# Patient Record
Sex: MALE | Race: OTHER | HISPANIC OR LATINO | Employment: OTHER | URBAN - METROPOLITAN AREA
[De-identification: names, ages, dates, MRNs, and addresses within clinical notes are randomized per-mention and may not be internally consistent; named-entity substitution may affect disease eponyms.]

---

## 2024-05-26 ENCOUNTER — HOSPITAL ENCOUNTER (EMERGENCY)
Facility: HOSPITAL | Age: 49
Discharge: HOME | End: 2024-05-26
Attending: STUDENT IN AN ORGANIZED HEALTH CARE EDUCATION/TRAINING PROGRAM
Payer: COMMERCIAL

## 2024-05-26 VITALS
OXYGEN SATURATION: 97 % | RESPIRATION RATE: 16 BRPM | HEART RATE: 88 BPM | SYSTOLIC BLOOD PRESSURE: 165 MMHG | DIASTOLIC BLOOD PRESSURE: 62 MMHG | TEMPERATURE: 99 F | WEIGHT: 194 LBS

## 2024-05-26 DIAGNOSIS — T15.92XA FOREIGN BODY OF LEFT EYE, INITIAL ENCOUNTER: Primary | ICD-10-CM

## 2024-05-26 DIAGNOSIS — H57.12 ACUTE LEFT EYE PAIN: ICD-10-CM

## 2024-05-26 DIAGNOSIS — T15.02XA RUST RING OF LEFT CORNEA DUE TO METALLIC FOREIGN BODY: ICD-10-CM

## 2024-05-26 PROCEDURE — 65222 REMOVE FOREIGN BODY FROM EYE: CPT | Mod: LT

## 2024-05-26 PROCEDURE — 25000003 PHARM REV CODE 250: Performed by: STUDENT IN AN ORGANIZED HEALTH CARE EDUCATION/TRAINING PROGRAM

## 2024-05-26 PROCEDURE — 99283 EMERGENCY DEPT VISIT LOW MDM: CPT

## 2024-05-26 RX ORDER — OFLOXACIN 3 MG/ML
1 SOLUTION/ DROPS OPHTHALMIC
Qty: 10 ML | Refills: 0 | Status: SHIPPED | OUTPATIENT
Start: 2024-05-26 | End: 2024-06-02

## 2024-05-26 RX ADMIN — FLUORESCEIN SODIUM 1 EACH: 1 STRIP OPHTHALMIC at 02:05

## 2024-05-26 NOTE — ED PROVIDER NOTES
Encounter Date: 5/26/2024    SCRIBE #1 NOTE: I, Genie Cruz, am scribing for, and in the presence of,  Joey Hartman MD. I have scribed the following portions of the note - Other sections scribed: HPI, ROS, PE.       History     Chief Complaint   Patient presents with    Eye Problem     Pt. C/o left eye pain x2 days ago reports was seen at urgent care and told he has a piece of metal in his eye. And sent here. Reports vision changes in affected eye     Patient is a 48 year old male that presents to the ED for left eye pain and redness onset 2 days ago. The patient states he was working with metal two days ago. He denies visual disturbances.     The history is provided by the patient and medical records. No  was used.     Review of patient's allergies indicates:  No Known Allergies  No past medical history on file.  No past surgical history on file.  No family history on file.     Review of Systems   Constitutional:  Negative for chills and fever.   HENT:  Negative for congestion, rhinorrhea and sore throat.    Eyes:  Positive for pain and redness. Negative for visual disturbance.   Respiratory:  Negative for cough and shortness of breath.    Cardiovascular:  Negative for chest pain.   Gastrointestinal:  Negative for abdominal pain, nausea and vomiting.   Genitourinary:  Negative for dysuria and hematuria.   Musculoskeletal:  Negative for joint swelling.   Skin:  Negative for rash.   Neurological:  Negative for weakness.   Psychiatric/Behavioral:  Negative for confusion.    All other systems reviewed and are negative.      Physical Exam     Initial Vitals [05/26/24 1300]   BP Pulse Resp Temp SpO2   (!) 165/62 88 16 98.6 °F (37 °C) 97 %      MAP       --         Physical Exam    Nursing note and vitals reviewed.  Constitutional: He is not diaphoretic. No distress.   HENT:   Head: Normocephalic and atraumatic.   Eyes: EOM are normal. Pupils are equal, round, and reactive to light. Left conjunctiva  is injected.   Small ocular foreign body over cornea. Left visual acuity 20/25. Right visual acuity 20/25.   Neck: Neck supple.   Normal range of motion.  Cardiovascular:  Normal rate and regular rhythm.           Pulmonary/Chest: Breath sounds normal. No respiratory distress.   Abdominal: Abdomen is soft. He exhibits no distension. There is no abdominal tenderness.   Musculoskeletal:         General: No edema.      Cervical back: Normal range of motion and neck supple.     Neurological: He is alert and oriented to person, place, and time. He has normal strength. No cranial nerve deficit or sensory deficit.   Skin: Skin is warm. Capillary refill takes less than 2 seconds.   Psychiatric: He has a normal mood and affect.         ED Course   Foreign Body    Date/Time: 5/26/2024 1:40 PM    Performed by: Joey Hartman IV, MD  Authorized by: Joey Hartman IV, MD  Consent Done: Yes  Consent: Verbal consent obtained.  Risks and benefits: risks, benefits and alternatives were discussed  Consent given by: patient  Patient identity confirmed: name and verbally with patient  Body area: eye  Anesthesia method: properacaine.  Localization method: slit lamp, eyelid eversion and visualized  Removal mechanism: 27-gauge needle, 25-gauge needle, moist cotton swab and irrigation  Corneal abrasion size: small  Corneal abrasion location: central  Residual rust ring present.  Dressing: antibiotic drops  Depth: embedded  Complexity: complex  1 objects recovered.  Post-procedure assessment: foreign body removed  Patient tolerance: Patient tolerated the procedure well with no immediate complications      Labs Reviewed - No data to display       Imaging Results    None          Medications   fluorescein ophthalmic strip 1 each (1 each Right Eye Given 5/26/24 1430)     Medical Decision Making  48-year-old presenting with metal ocular foreign body on the left for 2 days  Seen in urgent Care then sent here for removal  Foreign body removed  with slit-lamp residual rust ring versus small retained foreign body visualized  I discussed this with patient and need for follow up with Ophthalmology I discussed the case with Dr. Armstrong who saw the patient in clinic this week likely Tuesday patient instructed to call the clinic and set up an appointment will put on antibiotic drops until then return precautions given    Problems Addressed:  Acute left eye pain: acute illness or injury that poses a threat to life or bodily functions  Foreign body of left eye, initial encounter: acute illness or injury that poses a threat to life or bodily functions  Rust ring of left cornea due to metallic foreign body: acute illness or injury that poses a threat to life or bodily functions    Amount and/or Complexity of Data Reviewed  Discussion of management or test interpretation with external provider(s): Discussed with Dr. Higuera ophthalmology who recommends clinic follow up ofloxacin drops    Risk  OTC drugs.  Prescription drug management.  Minor surgery with identified risk factors.            Scribe Attestation:   Scribe #1: I performed the above scribed service and the documentation accurately describes the services I performed. I attest to the accuracy of the note.    Attending Attestation:           Physician Attestation for Scribe:  Physician Attestation Statement for Scribe #1: I, Joey Hartman MD, reviewed documentation, as scribed by Genie Cruz in my presence, and it is both accurate and complete.             ED Course as of 05/26/24 1936   Sun May 26, 2024   1527 Paged ophthalmology  [ED]   1534 Discussed with Venancio Armstrong MD [ED]   1535 Dr. Armstrong - recommends follow up in clinic on Tuesday antibiotic drops return precautions [AC]   1544 Re-evaluated the patient at this time. He reports feeling better. Lid everted, no residual foreign bodies visualized. No residual foreign bodies visualized from slit lamp.  [ED]      ED Course User Index  [AC] Joey Hartman IV,  MD  [ED] Genie Cruz                           Clinical Impression:  Final diagnoses:  [H57.12] Acute left eye pain  [T15.02XA] Rust ring of left cornea due to metallic foreign body  [T15.92XA] Foreign body of left eye, initial encounter (Primary)          ED Disposition Condition    Discharge           ED Prescriptions       Medication Sig Dispense Start Date End Date Auth. Provider    ofloxacin (OCUFLOX) 0.3 % ophthalmic solution Place 1 drop into the left eye every hour while awake. for 7 days 10 mL 5/26/2024 6/2/2024 Joey Hartman IV, MD          Follow-up Information       Follow up With Specialties Details Why Contact Info    [facility] - Emergency Dept Emergency Medicine Go to  If symptoms worsen [address] [facility] [address] [phone]    Primary care physician  Schedule an appointment as soon as possible for a visit   Follow up with you primary care physician.   If you do not have a primary care physician call [phone] to schedule an appointment.    Avelino Solorzano MD Internal Medicine Schedule an appointment as soon as possible for a visit   [address] [facility] [address] [phone]      Venancio Armstrong MD Ophthalmology Schedule an appointment as soon as possible for a visit   [address] [facility]  [address]  [facility] [address] [phone]               Joey Hartman IV, MD  05/26/24 1936

## 2024-05-26 NOTE — DISCHARGE INSTRUCTIONS
Thanks for letting use take care of you today! It is our goal to give you courteous care and to keep you comfortable and informed. If you have any questions before you leave I will be happy to try and answer them.     Advice after your visit:  Your visit in the emergency department is NOT definitive care - please follow-up with your primary care doctor and/or specialist within 1-2 days. If you do not have a primary care physician call [phone] to schedule an appointment. Please return if you have any worsening in your condition or if you have any other concerns.    Return to the emergency department if any worsening symptoms including fever, chest pain, difficulty breathing, weakness, numbness, tingling, nausea, vomiting, inability to eat, drink or take your medication, or any other new symptoms or concerns arise.      Please signup for MyChart as noted below in your paperwork to review all labwork, imaging results, and any other incidental findings from today's visit.     If you had radiology exams like an XRAY or CT in the emergency Department the interpreation on them may be preliminary - there may be less time sensitive findings on the reports please obtain these reports within 24 hours from the hospital or by using your out on your mobile phone to access records.  Bring these to your primary care doctor and/or specialist for further review of incidental findings.    Please review any LAB WORK from your visit today with your primary care physician.    If you were prescribed OPIATE PAIN MEDICATION - please understand of these medications can be addictive, you may fill less of the prescription was written for, you do not have to take the full prescription.  You may discard what you do not use.  Please seek help if you feel you are having problems with addiction.  Do not drive or operate heavy machinery if you are taking sedating medications.  Do not mix these medications with alcohol.      If you had a SPLINT  placed in the emergency department if you have severe pain numbness tingling or discoloration of year digits please remove the splint and return to the emergency department for further evaluation as this may represent a sign of compromise to the nerves or blood vessels due to swelling.    If you had SUTURES in the emergency department please have them removed in the prescribed time frame typically within 7-14 days.  You may shower but please do not bathe or swim.  Keep the wounds clean and dry and covered with a clean dressing.  Please return if he have any signs of infection like redness or drainage or pain at the suture site.    Please take the full course of  any ANTIBIOTICS you were prescribed - incomplete courses of antibiotics can cause resistance to antibiotics in the future which will make it difficult to treat any infections you may have.     normal... Well appearing, awake, alert, oriented to person, place, time/situation and in no apparent distress.

## 2024-05-26 NOTE — FIRST PROVIDER EVALUATION
Medical screening examination initiated.  I have conducted a focused provider triage encounter, findings are as follows:    Brief history of present illness:  47y/o M presents to the ED with FB sensation to left eye. Reports onset of symptoms occurred three days ago    There were no vitals filed for this visit.    Pertinent physical exam:  AAA x 3    Brief workup plan:  MD evaluation     Preliminary workup initiated; this workup will be continued and followed by the physician or advanced practice provider that is assigned to the patient when roomed.